# Patient Record
Sex: FEMALE | Race: WHITE
[De-identification: names, ages, dates, MRNs, and addresses within clinical notes are randomized per-mention and may not be internally consistent; named-entity substitution may affect disease eponyms.]

---

## 2017-01-19 ENCOUNTER — HOSPITAL ENCOUNTER (OUTPATIENT)
Dept: HOSPITAL 53 - M LAB REF | Age: 27
End: 2017-01-19
Attending: ADVANCED PRACTICE MIDWIFE
Payer: COMMERCIAL

## 2017-01-19 DIAGNOSIS — Z12.4: Primary | ICD-10-CM

## 2017-01-20 ENCOUNTER — HOSPITAL ENCOUNTER (OUTPATIENT)
Dept: HOSPITAL 53 - M LAB | Age: 27
End: 2017-01-20
Attending: ADVANCED PRACTICE MIDWIFE
Payer: COMMERCIAL

## 2017-01-20 DIAGNOSIS — N91.2: Primary | ICD-10-CM

## 2017-04-26 ENCOUNTER — HOSPITAL ENCOUNTER (OUTPATIENT)
Dept: HOSPITAL 53 - M SFHCLERA | Age: 27
End: 2017-04-26
Attending: NURSE PRACTITIONER
Payer: COMMERCIAL

## 2017-04-26 DIAGNOSIS — R50.9: Primary | ICD-10-CM

## 2017-08-29 ENCOUNTER — HOSPITAL ENCOUNTER (OUTPATIENT)
Dept: HOSPITAL 53 - M LRY | Age: 27
End: 2017-08-29
Attending: FAMILY MEDICINE
Payer: COMMERCIAL

## 2017-08-29 DIAGNOSIS — M54.5: ICD-10-CM

## 2017-08-29 DIAGNOSIS — M62.838: Primary | ICD-10-CM

## 2017-08-29 NOTE — REP
Clinical:  Paraspinal muscular pain .

 

Technique:  AP, lateral, flexion/extension, bilateral oblique, and open-mouth

views.

 

Findings:

Alignment and lordosis is maintained.  There is no evidence for acute fracture /

compression injury or subluxation.  Minimal irregularity at the C6-7 end plates

and disc space suggested and should be correlated clinically.  Otherwise, no

further significant degenerative changes are appreciated.  Oblique views

demonstrate patent neural foramen.  Open mouth view demonstrates normal C1-C2

articulation and odontoid process.

 

Impression:

Minimal degenerative disc disease at the C6-7 level cannot be excluded.

Otherwise unremarkable examination.

 

 

Signed by

Alfredo Parham MD 08/29/2017 08:17 P

## 2017-08-29 NOTE — REP
Clinical:  Acute right-sided lower back pain .

 

Technique:  AP, lateral, bilateral oblique, and coned-down views.

 

Findings:  Alignment and lordosis is maintained.  The vertebral bodies including

transverse process and spinous processes are intact and normal.  There is no

evidence for acute fracture / compression injury or subluxation.  No evidence for

spondylolysis or spondylolisthesis.  No significant degenerative change is

noted.

 

Impression:

Normal lumbosacral spine radiograph series.

 

 

Signed by

Alfredo Parham MD 08/29/2017 08:21 P

## 2018-01-19 ENCOUNTER — HOSPITAL ENCOUNTER (OUTPATIENT)
Dept: HOSPITAL 53 - M RAD | Age: 28
End: 2018-01-19
Attending: PHYSICIAN ASSISTANT
Payer: COMMERCIAL

## 2018-01-19 DIAGNOSIS — M54.5: ICD-10-CM

## 2018-01-19 DIAGNOSIS — M51.34: Primary | ICD-10-CM

## 2018-01-19 PROCEDURE — 72141 MRI NECK SPINE W/O DYE: CPT

## 2018-08-17 ENCOUNTER — HOSPITAL ENCOUNTER (OUTPATIENT)
Dept: HOSPITAL 53 - M SDC | Age: 28
Discharge: HOME | End: 2018-08-17
Attending: SURGERY
Payer: COMMERCIAL

## 2018-08-17 DIAGNOSIS — K64.8: Primary | ICD-10-CM

## 2018-08-17 LAB
CONTROL LINE UCG: (no result)
URINE PREG TEST: NEGATIVE

## 2018-08-17 PROCEDURE — 46260 REMOVE IN/EX HEM GROUPS 2+: CPT

## 2018-08-17 RX ADMIN — ONDANSETRON 1 MG: 2 INJECTION INTRAMUSCULAR; INTRAVENOUS at 13:50

## 2018-08-17 RX ADMIN — BUPIVACAINE 1 ML: 13.3 INJECTION, SUSPENSION, LIPOSOMAL INFILTRATION at 14:13

## 2018-08-17 RX ADMIN — FENTANYL CITRATE 1 MCG: 50 INJECTION, SOLUTION INTRAMUSCULAR; INTRAVENOUS at 15:18

## 2018-08-17 RX ADMIN — FENTANYL CITRATE 1 MCG: 50 INJECTION, SOLUTION INTRAMUSCULAR; INTRAVENOUS at 15:13

## 2018-08-17 RX ADMIN — SODIUM CHLORIDE, POTASSIUM CHLORIDE, SODIUM LACTATE AND CALCIUM CHLORIDE 1 MLS/HR: 600; 310; 30; 20 INJECTION, SOLUTION INTRAVENOUS at 06:00

## 2018-08-17 RX ADMIN — BUPIVACAINE HYDROCHLORIDE 1 ML: 5 INJECTION, SOLUTION EPIDURAL; INTRACAUDAL at 14:29

## 2018-10-26 ENCOUNTER — HOSPITAL ENCOUNTER (OUTPATIENT)
Dept: HOSPITAL 53 - M LAB REF | Age: 28
End: 2018-10-26
Attending: FAMILY MEDICINE
Payer: COMMERCIAL

## 2018-10-26 DIAGNOSIS — D22.4: Primary | ICD-10-CM

## 2018-11-04 ENCOUNTER — HOSPITAL ENCOUNTER (OUTPATIENT)
Dept: HOSPITAL 53 - M LAB REF | Age: 28
End: 2018-11-04
Attending: PHYSICIAN ASSISTANT
Payer: COMMERCIAL

## 2018-11-04 DIAGNOSIS — J02.9: Primary | ICD-10-CM
